# Patient Record
Sex: FEMALE | Race: WHITE | NOT HISPANIC OR LATINO | URBAN - METROPOLITAN AREA
[De-identification: names, ages, dates, MRNs, and addresses within clinical notes are randomized per-mention and may not be internally consistent; named-entity substitution may affect disease eponyms.]

---

## 2024-10-11 ENCOUNTER — APPOINTMENT (OUTPATIENT)
Dept: INTEGRATIVE MEDICINE | Facility: CLINIC | Age: 39
End: 2024-10-11

## 2024-12-01 ENCOUNTER — APPOINTMENT (OUTPATIENT)
Dept: SLEEP MEDICINE | Facility: CLINIC | Age: 39
End: 2024-12-01
Payer: COMMERCIAL

## 2024-12-22 ENCOUNTER — OFFICE VISIT (OUTPATIENT)
Dept: URGENT CARE | Age: 39
End: 2024-12-22
Payer: COMMERCIAL

## 2024-12-22 VITALS
OXYGEN SATURATION: 99 % | WEIGHT: 135 LBS | DIASTOLIC BLOOD PRESSURE: 58 MMHG | HEIGHT: 62 IN | RESPIRATION RATE: 20 BRPM | HEART RATE: 68 BPM | SYSTOLIC BLOOD PRESSURE: 116 MMHG | TEMPERATURE: 98 F | BODY MASS INDEX: 24.84 KG/M2

## 2024-12-22 DIAGNOSIS — J06.9 UPPER RESPIRATORY TRACT INFECTION, UNSPECIFIED TYPE: Primary | ICD-10-CM

## 2024-12-22 LAB — POC RAPID STREP: NEGATIVE

## 2024-12-22 PROCEDURE — 99213 OFFICE O/P EST LOW 20 MIN: CPT | Performed by: PHYSICIAN ASSISTANT

## 2024-12-22 PROCEDURE — 87880 STREP A ASSAY W/OPTIC: CPT | Performed by: PHYSICIAN ASSISTANT

## 2024-12-22 PROCEDURE — 3008F BODY MASS INDEX DOCD: CPT | Performed by: PHYSICIAN ASSISTANT

## 2024-12-22 ASSESSMENT — ENCOUNTER SYMPTOMS: SORE THROAT: 1

## 2024-12-22 NOTE — PATIENT INSTRUCTIONS
Please follow up with your primary provider within one week if symptoms do not improve.  You may schedule an appointment online at [email or web address] or call [phone]. Go to the Emergency Department if symptoms significantly worsen or if you develop chest pain or shortness of breath.     I recommend Delsym, Flonase, ibuprofen.

## 2024-12-22 NOTE — PROGRESS NOTES
"Subjective   Patient ID: Martita Thomas is a 39 y.o. female. They present today with a chief complaint of Sore Throat (Sore throat for 3 days.  Also bilat earache).    History of Present Illness  Reports nasal congestion, hoarse voice, scratchy sore throat, pressure in both ears over the past 2 days.  Dry cough for 1 day.  Denies fever, chest pain, shortness of breath.  States both of her children have mild URI symptoms without fevers.  Has tried Mucinex and ibuprofen with some relief.  Requesting strep testing to rule it out before being around family for the holidays      Sore Throat         Past Medical History  Allergies as of 12/22/2024    (No Known Allergies)       (Not in a hospital admission)       History reviewed. No pertinent past medical history.    History reviewed. No pertinent surgical history.         Review of Systems  Pertinent systems reviewed and were negative unless otherwise stated in HPI.    Objective    Vitals:    12/22/24 1123   BP: 116/58   Pulse: 68   Resp: 20   Temp: 36.7 °C (98 °F)   SpO2: 99%   Weight: 61.2 kg (135 lb)   Height: 1.575 m (5' 2\")     No LMP recorded.    Physical Exam  Constitutional:       General: She is not in acute distress.  HENT:      Right Ear: Tympanic membrane, ear canal and external ear normal.      Left Ear: Tympanic membrane, ear canal and external ear normal.      Nose: Congestion present.      Mouth/Throat:      Mouth: Mucous membranes are moist.      Pharynx: Posterior oropharyngeal erythema (Mild) present. No oropharyngeal exudate.   Eyes:      Conjunctiva/sclera: Conjunctivae normal.   Cardiovascular:      Rate and Rhythm: Normal rate and regular rhythm.   Pulmonary:      Effort: Pulmonary effort is normal. No respiratory distress.   Lymphadenopathy:      Cervical: Cervical adenopathy present.   Skin:     Findings: No rash.         Diagnostic study results (if any) were reviewed by Raphael Jordan PA-C.    Assessment/Plan   Allergies, medications, " history, and pertinent labs/EKGs/imaging reviewed by Raphael Jordan PA-C.     Medical Decision Making  Sore throat, nasal symptoms, dry cough without fever for 2 days most consistent with viral URI.  Low concern for COVID, flu, otitis, strep, pneumonia    Orders and Diagnoses  Diagnoses and all orders for this visit:  Upper respiratory tract infection, unspecified type      Medical Admin Record      Disposition: Home    Electronically signed by Raphael Jordan PA-C

## 2025-01-09 ENCOUNTER — OFFICE VISIT (OUTPATIENT)
Dept: URGENT CARE | Age: 40
End: 2025-01-09
Payer: COMMERCIAL

## 2025-01-09 VITALS
HEART RATE: 66 BPM | HEIGHT: 62 IN | TEMPERATURE: 97.8 F | DIASTOLIC BLOOD PRESSURE: 72 MMHG | BODY MASS INDEX: 24.84 KG/M2 | SYSTOLIC BLOOD PRESSURE: 121 MMHG | WEIGHT: 135 LBS | OXYGEN SATURATION: 98 %

## 2025-01-09 DIAGNOSIS — H92.02 OTALGIA OF LEFT EAR: ICD-10-CM

## 2025-01-09 DIAGNOSIS — J01.90 ACUTE NON-RECURRENT SINUSITIS, UNSPECIFIED LOCATION: Primary | ICD-10-CM

## 2025-01-09 DIAGNOSIS — N76.0 ACUTE VAGINITIS: ICD-10-CM

## 2025-01-09 PROCEDURE — 99213 OFFICE O/P EST LOW 20 MIN: CPT | Performed by: PHYSICIAN ASSISTANT

## 2025-01-09 PROCEDURE — 3008F BODY MASS INDEX DOCD: CPT | Performed by: PHYSICIAN ASSISTANT

## 2025-01-09 RX ORDER — DOXYCYCLINE 100 MG/1
100 CAPSULE ORAL 2 TIMES DAILY
Qty: 20 CAPSULE | Refills: 0 | Status: SHIPPED | OUTPATIENT
Start: 2025-01-09 | End: 2025-01-19

## 2025-01-09 RX ORDER — FLUCONAZOLE 200 MG/1
200 TABLET ORAL ONCE
Qty: 2 TABLET | Refills: 0 | Status: SHIPPED | OUTPATIENT
Start: 2025-01-09 | End: 2025-01-09

## 2025-01-09 RX ORDER — IBUPROFEN 800 MG/1
800 TABLET ORAL EVERY 8 HOURS PRN
Qty: 30 TABLET | Refills: 0 | Status: SHIPPED | OUTPATIENT
Start: 2025-01-09 | End: 2025-01-19

## 2025-01-09 NOTE — PROGRESS NOTES
"Subjective   Patient ID: Martita Thomas is a 39 y.o. female. They present today with a chief complaint of Earache (Excruciating pain in left ear for days and now it's going down neck  ).    History of Present Illness  Left ear pain radiates into neck started 4 - 5 days ago.    Denies fever, chills, sweats, chest pain, SOB, sore throat, cough.    Hx of TMJ    Also, nasal congestion/rhinorrhea, chronic, hx of allergies    LMP, 3 weeks.       Earache   Associated symptoms include rhinorrhea. Pertinent negatives include no coughing or sore throat.       Past Medical History  Allergies as of 01/09/2025    (No Known Allergies)       (Not in a hospital admission)       History reviewed. No pertinent past medical history.    History reviewed. No pertinent surgical history.         Review of Systems  Review of Systems   Constitutional:  Negative for chills, diaphoresis and fever.   HENT:  Positive for congestion, ear pain and rhinorrhea. Negative for sore throat.    Respiratory:  Negative for cough and shortness of breath.    Cardiovascular:  Negative for chest pain.   All other systems reviewed and are negative.                                 Objective    Vitals:    01/09/25 1816   BP: 121/72   Pulse: 66   Temp: 36.6 °C (97.8 °F)   TempSrc: Oral   SpO2: 98%   Weight: 61.2 kg (135 lb)   Height: 1.575 m (5' 2\")     No LMP recorded.    Physical Exam  Vitals and nursing note reviewed.   Constitutional:       General: She is not in acute distress.  HENT:      Right Ear: Tympanic membrane normal.      Ears:      Comments: Left tm erythema     Nose: Congestion and rhinorrhea present.   Cardiovascular:      Rate and Rhythm: Normal rate and regular rhythm.      Heart sounds: Normal heart sounds.   Pulmonary:      Effort: Pulmonary effort is normal.      Breath sounds: Normal breath sounds.   Neurological:      Mental Status: She is alert.         Procedures    Point of Care Test & Imaging Results from this visit  No results " found for this visit on 01/09/25.   No results found.    Diagnostic study results (if any) were reviewed by Tanya Kenny PA-C.    Assessment/Plan   Allergies, medications, history, and pertinent labs/EKGs/Imaging reviewed by Tanya Kenny PA-C.     Orders and Diagnoses  There are no diagnoses linked to this encounter.    Medical Admin Record      Patient disposition: Home    Electronically signed by Tanya Kenny PA-C  6:45 PM

## 2025-01-13 ASSESSMENT — ENCOUNTER SYMPTOMS
SHORTNESS OF BREATH: 0
CHILLS: 0
FEVER: 0
DIAPHORESIS: 0
COUGH: 0
SORE THROAT: 0
RHINORRHEA: 1